# Patient Record
Sex: FEMALE | Race: WHITE
[De-identification: names, ages, dates, MRNs, and addresses within clinical notes are randomized per-mention and may not be internally consistent; named-entity substitution may affect disease eponyms.]

---

## 2019-06-24 ENCOUNTER — HOSPITAL ENCOUNTER (OUTPATIENT)
Dept: HOSPITAL 47 - RADMAMWWP | Age: 44
Discharge: HOME | End: 2019-06-24
Attending: FAMILY MEDICINE
Payer: COMMERCIAL

## 2019-06-24 DIAGNOSIS — Z12.31: Primary | ICD-10-CM

## 2019-06-24 DIAGNOSIS — R22.1: ICD-10-CM

## 2019-06-24 DIAGNOSIS — Z90.89: ICD-10-CM

## 2019-06-24 PROCEDURE — 76536 US EXAM OF HEAD AND NECK: CPT

## 2019-06-24 PROCEDURE — 77063 BREAST TOMOSYNTHESIS BI: CPT

## 2019-06-24 PROCEDURE — 77067 SCR MAMMO BI INCL CAD: CPT

## 2019-06-24 NOTE — US
EXAMINATION TYPE: US thyroid st tissue head/neck

 

DATE OF EXAM: 6/24/2019

 

COMPARISON: NONE

 

CLINICAL HISTORY: 44-year-old female R22.1 Neck swelling. Right neck swelling x 1 year, left thyroide
ctomy

 

TECHNIQUE: Multiple sonographic images of the thyroid gland are obtained.

 

FINDINGS:

 

GLAND SIZE:

 

Right Lobe: 5.7 x 1.8 x 2.6 cm

** Overall Parenchyma:  mildly heterogeneous

Left Lobe: surgically absent 

Isthmus Thickness:  0.3 cm

 

NODULES

 

RIGHT: # of nodules measured on right: 0

 

LEFT: surgically absent

 

ISTHMUS: # of nodules measured in the isthmus:  0

 

Bilateral neck scanned, right neck submandibular: 4.3 x 1.8 x 2.8cm hypoechoic non vascular structure
 seen at patient's area of concern.

 

 

 

 

 

IMPRESSION:

 

1. Status post left thyroidectomy. No discrete nodule on the right.

2. At the site of patient's clinical concern, there is a 4.3 x 2.8 x 1.8 cm solid hypoechoic structur
e in the right submandibular region. An enlarged lymph node or nonspecific salivary gland tumor are a
 couple differential considerations.  Contrast enhanced CT soft tissue of the neck can further evalua
te.

## 2019-06-25 NOTE — MM
Reason for exam: screening  (asymptomatic).

Last mammogram was performed 1 year and 1 month ago.



Physical Findings:

A clinical breast exam by your physician is recommended on an annual basis and 

results should be correlated with mammographic findings.



MG 3D Screening Mammo W/Cad

Bilateral CC and MLO view(s) were taken.

Prior study comparison: May 18, 2018, mammogram.  January 16, 2017, mammogram.

The breast tissue is heterogeneously dense. This may lower the sensitivity of 

mammography.  Focal asymmetry bilateral upper outer quadrant.

This finding is changed when compared with previous exams.





ASSESSMENT: Incomplete: need additional imaging evaluation, BI-RAD 0



RECOMMENDATION:

Special view mammogram of both breasts.



If lesion persists on supplemental views, image directed ultrasound is 

recommended.



[facility] will attempt to contact patient to return for supplemental 

views and ultrasound if indicated.

## 2019-06-27 ENCOUNTER — HOSPITAL ENCOUNTER (OUTPATIENT)
Dept: HOSPITAL 47 - RADMAMWWP | Age: 44
Discharge: HOME | End: 2019-06-27
Attending: FAMILY MEDICINE
Payer: COMMERCIAL

## 2019-06-27 DIAGNOSIS — R92.8: Primary | ICD-10-CM

## 2019-06-27 PROCEDURE — 77066 DX MAMMO INCL CAD BI: CPT

## 2019-06-27 PROCEDURE — 77062 BREAST TOMOSYNTHESIS BI: CPT

## 2019-06-28 NOTE — MM
Reason for exam: additional evaluation requested from abnormal screening.

Last mammogram was performed less than 1 month ago.



Physical Findings:

Nurse did not find any significant physical abnormalities on exam.



MG 3D Work Up W/Cad DORA

Bilateral spot compression CC, spot compression MLO, and LM view(s) were taken.

Prior study comparison: June 24, 2019, bilateral MG 3d screening mammo w/cad.  May

18, 2018, mammogram.

The breast tissue is heterogeneously dense. This may lower the sensitivity of 

mammography.  The previously seen abnormality resolves on additional views and 

appears as fibroglandular tissue compatible with summation on the right breast. 

Left focal asymmetry improves on additional views in the upper outer quadrant but 

precautionary ultrasound will be done.



These results were verbally communicated with the patient and result sheet given 

to the patient on 6/27/19.





ASSESSMENT: Incomplete: need additional imaging evaluation, BI-RAD 0



RECOMMENDATION:

Ultrasound of the left breast.

upper outer quadrant

## 2019-06-28 NOTE — USB
Reason for exam: additional evaluation requested from abnormal screening.



US Breast Workup Limited LT

Left limited breast ultrasound including focal area of concern, retroareolar and 

axilla demonstrates no cystic or solid lesion seen.



These results were verbally communicated with the patient and result sheet given 

to the patient on 6/27/19.





ASSESSMENT: Probably benign, BI-RAD 3



RECOMMENDATION:

Follow-up diagnostic mammogram of the left breast in 6 months.